# Patient Record
Sex: MALE | Race: BLACK OR AFRICAN AMERICAN | NOT HISPANIC OR LATINO | URBAN - METROPOLITAN AREA
[De-identification: names, ages, dates, MRNs, and addresses within clinical notes are randomized per-mention and may not be internally consistent; named-entity substitution may affect disease eponyms.]

---

## 2021-07-01 ENCOUNTER — HOSPITAL ENCOUNTER (EMERGENCY)
Dept: EMERGENCY MEDICINE | Facility: CLINIC | Age: 66
Discharge: HOME | End: 2021-07-01
Attending: EMERGENCY MEDICINE

## 2021-07-01 DIAGNOSIS — G31.9 CEREBRAL VENTRICULOMEGALY DUE TO BRAIN ATROPHY (H): ICD-10-CM

## 2021-07-01 LAB
ALBUMIN UR-MCNC: NEGATIVE G/DL
ANION GAP SERPL CALCULATED.3IONS-SCNC: 8 MMOL/L (ref 5–18)
APPEARANCE UR: CLEAR
BACTERIA #/AREA URNS HPF: ABNORMAL /[HPF]
BILIRUB UR QL STRIP: NEGATIVE
BUN SERPL-MCNC: 14 MG/DL (ref 8–22)
CALCIUM SERPL-MCNC: 9.2 MG/DL (ref 8.5–10.5)
CHLORIDE BLD-SCNC: 109 MMOL/L (ref 98–107)
CO2 SERPL-SCNC: 22 MMOL/L (ref 22–31)
COLOR UR AUTO: COLORLESS
CREAT BLD-MCNC: 1.2 MG/DL (ref 0.7–1.3)
CREAT SERPL-MCNC: 1.07 MG/DL (ref 0.7–1.3)
ERYTHROCYTE [DISTWIDTH] IN BLOOD BY AUTOMATED COUNT: 14.7 % (ref 11–14.5)
GFR SERPL CREATININE-BSD FRML MDRD: >60 ML/MIN/1.73M2
GFR SERPL CREATININE-BSD FRML MDRD: >60 ML/MIN/1.73M2
GLUCOSE BLD-MCNC: 111 MG/DL (ref 70–125)
GLUCOSE BLDC GLUCOMTR-MCNC: 107 MG/DL (ref 70–139)
GLUCOSE UR STRIP-MCNC: NEGATIVE MG/DL
HCT VFR BLD AUTO: 39.2 % (ref 40–54)
HGB BLD-MCNC: 13.1 G/DL (ref 14–18)
HGB UR QL STRIP: NEGATIVE
KETONES UR STRIP-MCNC: NEGATIVE MG/DL
LEUKOCYTE ESTERASE UR QL STRIP: NEGATIVE
MCH RBC QN AUTO: 28.7 PG (ref 27–34)
MCHC RBC AUTO-ENTMCNC: 33.4 G/DL (ref 32–36)
MCV RBC AUTO: 86 FL (ref 80–100)
NITRATE UR QL: NEGATIVE
PH UR STRIP: 7 [PH] (ref 5–8)
PLATELET # BLD AUTO: 222 THOU/UL (ref 140–440)
PMV BLD AUTO: 10.4 FL (ref 8.5–12.5)
POTASSIUM BLD-SCNC: 3.9 MMOL/L (ref 3.5–5)
RBC # BLD AUTO: 4.57 MILL/UL (ref 4.4–6.2)
RBC URINE: 1 HPF
SODIUM SERPL-SCNC: 139 MMOL/L (ref 136–145)
SP GR UR STRIP: >1.04 (ref 1–1.03)
SQUAMOUS EPITHELIAL: 0 /HPF
UROBILINOGEN UR STRIP-ACNC: ABNORMAL
WBC URINE: 1 HPF
WBC: 8.5 THOU/UL (ref 4–11)

## 2021-07-04 NOTE — ED NOTES
ED Notes by Cami Chaves, RN at 2021  3:56 PM     Author: Cami Chaves, RN Service: Emergency Medicine Author Type: Registered Nurse    Filed: 2021  4:11 PM Date of Service: 2021  3:56 PM Status: Signed    : Cami Chaves RN (Registered Nurse)       Met patient in CT for stroke code and received report from EMS. EMS report patient wandered into an apartment complex confused. EMS told that patient did not live there. Patient has no memory of address or . Equal  and strenght in feet. Charge RN in to CT and states Stroke code cancelled. Report given to Marco A NEGRO RN

## 2021-07-04 NOTE — ED TRIAGE NOTES
ED Triage Notes by Delicia Ibarra RN at 7/1/2021  3:57 PM     Author: Delicia Ibarra RN Service: Emergency Medicine Author Type: Registered Nurse    Filed: 7/1/2021  3:59 PM Date of Service: 7/1/2021  3:57 PM Status: Signed    : Delicia Ibarra RN (Registered Nurse)       Patient brought in by EMS for altered mental status 10 minutes prior to EMS being called.  Patient was in an apartment building office.  Dr. Mitchell assessed on arrival.  .  Poor historian.  EMS did not have a lot of information.  Stroke code called on arrival.  Dr. Mitchell states patient has a history of Alzheimers and will have CT exams but cancel stroke code.  Staff notified.

## 2021-07-08 NOTE — ED PROVIDER NOTES
ED Provider Notes by Rome Mitchell MD at 7/1/2021  3:52 PM     Author: Rome Mitchell MD Service: -- Author Type: Physician    Filed: 7/8/2021  4:30 AM Date of Service: 7/1/2021  3:52 PM Status: Signed    : Rome Mitchell MD (Physician)     Procedure Orders    1. Critical Care [[id number]] ordered by Rome Mitchell MD             EMERGENCY DEPARTMENT ENCOUNTER      NAME: Marco A Lee  AGE: 65 y.o. male  YOB: 1955  MRN: [id number]  EVALUATION DATE & TIME: 7/1/2021  4:12 PM    PCP: Provider, No Primary Care    ED PROVIDER: Rome Mitchell M.D.      Chief Complaint   Patient presents with   ? Altered Mental Status         FINAL IMPRESSION:  1. Cerebral ventriculomegaly due to brain atrophy (H)          ED COURSE & MEDICAL DECISION MAKING:    Pertinent Labs & Imaging studies reviewed. (See chart for details)  65 y.o. male presents to the Emergency Department for evaluation of confusion.  I was called emergently to the patient's bedside when he arrived by paramedics as the charge nurse was concerned about the possibility of a stroke in the patient.  He had been found wandering at a apartment building and attempted to enter the wrong house.  The witnesses on the scene described him as confused and paramedics were summoned.  The patient was initiated as a stroke code however I was able to review his medical records soon afterwards.  It appears that he has a history of Alzheimer's dementia.  He is originally from [facility] and is visiting [place].  He is planning on going to [place] soon but is staying with family now.  Dr. Mena called and I informed them of what I had found.  None the stroke code was canceled and the patient will be worked up as a altered mental status.  Family member did show up and confirm that this is his normal mental state.  The CT scan was done and showed cerebral ventriculomegaly.  I discussed this with the family and he does have dementia but does not have any  problem with his balance or urinary incontinence.  We discussed the entity of normal pressure hydrocephalus and he can certainly follow-up with his physicians later on regarding any further work-up for this.  In the meantime his family is comfortable taking him home and he is seen in otherwise his usual state of health.    Critical Care  Performed by: Rome Mitchell MD  Authorized by: Rome Mitchell MD   Total critical care time: 35 minutes  Critical care time was exclusive of separately billable procedures and treating other patients and teaching time.  Critical care was necessary to treat or prevent imminent or life-threatening deterioration of the following conditions: CNS failure or compromise.  Critical care was time spent personally by me on the following activities: development of treatment plan with patient or surrogate, discussions with consultants, evaluation of patient's response to treatment, examination of patient, obtaining history from patient or surrogate, ordering and performing treatments and interventions, ordering and review of laboratory studies, ordering and review of radiographic studies, re-evaluation of patient's condition and review of old charts.            3:45 PM I was called to meet medics to assess the patient for stroke.   3:46 PM I met with the patient, obtained an initial history, performed an examination and discussed the plan. PPE worn throughout all interactions with the patient, including surgical mask. Initiated a stroke code.  6:05 PM I attempted to call the patient's family member on file, it appears to be an incorrect number.  6:06 RN reports that the patient's family member is here  6:30 PM RN reports that the patient's family member is here.  6:35 PM I reassessed and updated the patient and his family member. Discussed the lab results and plan for discharge.     At the conclusion of the encounter I discussed the results of all of the tests and the disposition.  "The questions were answered. The patient or family acknowledged understanding and was agreeable with the care plan.       MEDICATIONS GIVEN IN THE EMERGENCY:  Medications   iopamidol solution 100 mL (ISOVUE-370) (100 mL Intravenous Given 7/1/21 1603)       NEW PRESCRIPTIONS STARTED AT TODAY'S ER VISIT  Discharge Medication List as of 7/1/2021  7:01 PM      CONTINUE these medications which have NOT CHANGED    Details   escitalopram oxalate (LEXAPRO) 5 MG tablet Take 1 tablet (5 mg total) by mouth daily for 15 days., Starting Thu 5/27/2021, Until Fri 6/11/2021, Print                =================================================================    HPI- limited as patient is a poor historian    Patient information was obtained from: patient    Use of Intrepreter: N/A       Marco A Lee is a 65 y.o. male with a pertinent history of Alzheimer's who presents to this ED by EMS for evaluation of altered mental status. At 3:00 PM (~45 minutes ago) the patient states he was trying to get back to where he was staying, but accidentally followed the wrong person and was attempting to enter the wrong house. Witnesses described him as \"confused,\" so they called EMS. Vital signs were stable for EMS, blood sugar 124. The patient is from [facility] and recently came to the [facility]. He has a neurologist in [facility] who prescribes him medications for memory and sleep and had them refilled by a physician in [place]. Patient states that he felt fine when he attempted to enter the home and states that he feels fine now. No slurred speech, extremity weakness, vision changes, neck pain, arm pain, leg pain, abdominal pain, chest pain, headache, or any additional symptoms at this time.     REVIEW OF SYSTEMS- limited as patient is a poor historian  Constitutional:  Denies fever, chills, weight loss or weakness, no loss of appetite  Eyes:  No vision changes, pain, diplopia, or vision loss  HENT:  Denies sore throat or ear pain.    Respiratory: " No SOB, wheeze or cough  Cardiovascular:  No CP, ALVARENGA, palpitations, syncope  GI:  Denies abdominal pain, nausea, vomiting, or dark, bloody stools. No diarrhea  Neurological: Negative for headache, slurred speech, extremity weakness.  Musculoskeletal: Negative for leg pain, arm pain, neck pain.      PAST MEDICAL HISTORY:  History reviewed. No pertinent past medical history.    PAST SURGICAL HISTORY:  History reviewed. No pertinent surgical history.        CURRENT MEDICATIONS:    No current facility-administered medications on file prior to encounter.      Current Outpatient Medications on File Prior to Encounter   Medication Sig   ? escitalopram oxalate (LEXAPRO) 5 MG tablet Take 1 tablet (5 mg total) by mouth daily for 15 days.       ALLERGIES:  No Known Allergies    FAMILY HISTORY:  History reviewed. No pertinent family history.    SOCIAL HISTORY:   Social History     Socioeconomic History   ? Marital status: Single     Spouse name: None   ? Number of children: None   ? Years of education: None   ? Highest education level: None   Occupational History   ? None   Social Needs   ? Financial resource strain: None   ? Food insecurity     Worry: None     Inability: None   ? Transportation needs     Medical: None     Non-medical: None   Tobacco Use   ? Smoking status: None   Substance and Sexual Activity   ? Alcohol use: None   ? Drug use: None   ? Sexual activity: None   Lifestyle   ? Physical activity     Days per week: None     Minutes per session: None   ? Stress: None   Relationships   ? Social connections     Talks on phone: None     Gets together: None     Attends [employer] service: None     Active member of club or organization: None     Attends meetings of clubs or organizations: None     Relationship status: None   ? Intimate partner violence     Fear of current or ex partner: None     Emotionally abused: None     Physically abused: None     Forced sexual activity: None   Other Topics Concern   ? None   Social  History Narrative   ? None       VITALS:  No data found.    PHYSICAL EXAM    General Appearance: Alert, cooperative, black male who seems to have some difficulty with recall of today's events  Head:  Normocephalic, without obvious abnormality, atraumatic  Eyes:  PERRL, conjunctiva/corneas clear  ENT:  Lips, mucosa, and tongue normal; teeth and gums normal  Neck:  Supple, symmetrical, trachea midline, no adenopathy; no carotid  bruit or JVD  Chest:  No tenderness or deformity  Cardio: Regular rate and rhythm without murmur, full symmetric peripheral pulses  Pulm:  Clear to auscultation bilaterally, respirations unlabored, no wheezes, rales or rhonchi.  Back:  Symmetric, no curvature, ROM normal, no CVA tenderness  Abdomen:  Soft, non-tender, bowel sounds active all four quadrants, no masses, no organomegaly  Extremities:  Extremities normal, atraumatic, no cyanosis or edema  Skin:  Skin color, texture, turgor normal, no rashes or lesions  Lymph:  Cervical, supraclavicular, and axillary nodes normal  Neuro: Alert, oriented to person, place, but not time. CNs grossly intact, motor and sensory intact throughout the extremities.  Bedside cerebellar testing normal.     LAB:  All pertinent labs reviewed and interpreted.  Results for orders placed or performed during the hospital encounter of 07/01/21   Basic Metabolic Panel   Result Value Ref Range    Sodium 139 136 - 145 mmol/L    Potassium 3.9 3.5 - 5.0 mmol/L    Chloride 109 (H) 98 - 107 mmol/L    CO2 22 22 - 31 mmol/L    Anion Gap, Calculation 8 5 - 18 mmol/L    Glucose 111 70 - 125 mg/dL    Calcium 9.2 8.5 - 10.5 mg/dL    BUN 14 8 - 22 mg/dL    Creatinine 1.07 0.70 - 1.30 mg/dL    GFR MDRD Af Amer >60 >60 mL/min/1.73m2    GFR MDRD Non Af Amer >60 >60 mL/min/1.73m2   HM2(CBC w/o Differential)   Result Value Ref Range    WBC 8.5 4.0 - 11.0 thou/uL    RBC 4.57 4.40 - 6.20 mill/uL    Hemoglobin 13.1 (L) 14.0 - 18.0 g/dL    Hematocrit 39.2 (L) 40.0 - 54.0 %    MCV 86 80 -  100 fL    MCH 28.7 27.0 - 34.0 pg    MCHC 33.4 32.0 - 36.0 g/dL    RDW 14.7 (H) 11.0 - 14.5 %    Platelets 222 140 - 440 thou/uL    MPV 10.4 8.5 - 12.5 fL   Urinalysis-UC if Indicated   Result Value Ref Range    Color, UA Colorless Light Yellow, Yellow    Clarity, UA Clear Clear    Glucose, UA Negative Negative    Protein, UA Negative Negative    Bilirubin, UA Negative Negative    Urobilinogen, UA <2.0 mg/dL <2.0 mg/dL    pH, UA 7.0 5.0 - 8.0    Blood, UA Negative Negative    Ketones, UA Negative Negative    Nitrite, UA Negative Negative    Leukocytes, UA Negative Negative    Specific Gravity, UA >1.040 (H) 1.001 - 1.030    RBC, UA 1 <=2 hpf    WBC UA 1 <=5 hpf    Bacteria, UA None Seen None Seen    Squamous Epithel, UA 0 <=5 /HPF   POCT Glucose    Specimen: Blood   Result Value Ref Range    Glucose 107 70 - 139 mg/dL   POCT CREATININE   Result Value Ref Range    iSTAT Creatinine 1.2 0.7 - 1.3 mg/dL    iSTAT GFR MDRD Af Amer >60 >60 mL/min/1.73m2    iSTAT GFR MDRD Non Af Amer >60 >60 mL/min/1.73m2       RADIOLOGY:  Reviewed all pertinent imaging. Please see official radiology report.  Cta Head And Neck    Result Date: 2021  EXAM: CTA HEAD AND NECK LOCATION: [facility] DATE/TIME: 2021 4:14 PM INDICATION: Neuro deficit, acute, stroke suspected EMS report patient wandered into an apartment complex confused. EMS told that patient did not live there. Patient has no memory of address or . COMPARISON: None. CONTRAST: Iopamidol (Isovue-370) 100mL TECHNIQUE: Head and neck CT angiogram with IV contrast. Noncontrast head CT followed by axial helical CT images of the head and neck vessels obtained during the arterial phase of intravenous contrast administration. Axial 2D reconstructed images and multiplanar 3D MIP reconstructed images of the head and neck vessels were performed by the technologist. Dose reduction techniques were used. All stenosis measurements made according to NASCET  criteria unless otherwise specified. FINDINGS: NONCONTRAST HEAD CT: INTRACRANIAL CONTENTS: No intracranial hemorrhage. Mild diffuse cerebral parenchymal volume loss. No midline shift. The basilar cisterns are patent. Mild periventricular and scattered foci of deep white matter hypodensities involving both cerebral hemispheres. The lateral and third ventricles are dilated greater than expected for the degree of sulcal dilatation. There is suggestion of crowding of the vertex. Mild periventricular and scattered foci of deep white matter hypodensities involving both cerebral hemispheres. No CT evidence for an acute infarct. No cerebellar tonsillar ectopia. VISUALIZED ORBITS/SINUSES/MASTOIDS: No intraorbital abnormality. Mild mucosal thickening of the ethmoid air cells. No middle ear or mastoid effusion. BONES/SOFT TISSUES: No acute abnormality. HEAD CTA: ANTERIOR CIRCULATION: No stenosis/occlusion, aneurysm, or high flow vascular malformation. Standard [place] anatomy. POSTERIOR CIRCULATION: No stenosis/occlusion, aneurysm, or high flow vascular malformation. Dominant right and smaller left vertebral artery contribute to a normal basilar artery. DURAL VENOUS SINUSES: Expected enhancement of the major dural venous sinuses. NECK CTA: RIGHT CAROTID: No measurable stenosis or dissection. LEFT CAROTID: No measurable stenosis or dissection. VERTEBRAL ARTERIES: No focal stenosis or dissection. Dominant right and smaller left vertebral arteries. AORTIC ARCH: Classic aortic arch anatomy with no significant stenosis at the origin of the great vessels. NONVASCULAR STRUCTURES: The lung apices are clear. Mild superior endplate compression fractures of T2 on T3, age indeterminate but favor to be long-standing. Please correlate with point tenderness over these levels.     HEAD CT: 1.  No intracranial hemorrhage, mass lesions, or CT evidence for an acute infarct. 2.  Mild diffuse cerebral parenchymal volume loss. Presumed  chronic hypertensive/microvascular ischemic white matter changes. 3.  The lateral and third ventricles are dilated greater than expected for the degree of sulcal dilatation. There is suggestion of crowding of the vertex. Although the findings could be related to central cerebral parenchymal volume loss, please correlate with clinical findings of normal pressure hydrocephalus. HEAD CTA: 1.  No high-grade stenoses or occlusion of the major intracranial arteries. No intracranial aneurysms. NECK CTA: 1.  Normal neck CTA. 2.  Mild superior endplate compression fractures of T2 on T3, age indeterminate but favor to be long-standing. Please correlate with point tenderness over these levels.      I, Rome Mitchell, am serving as a scribe to document services personally performed by Dr. Mitchell based on my observation and the provider's statements to me. I,  Rome Mitchell MD attest that Rome Mitchell is acting in a scribe capacity, has observed my performance of the services and has documented them in accordance with my direction.    Rome Mitchell M.D.  Emergency Medicine  [facility] EMERGENCY ROOM  [address] [facility] [address]  Dept: [phone]  Loc: [phone]       Rome Mitchell MD  07/08/21 2553